# Patient Record
Sex: MALE | Race: WHITE | URBAN - METROPOLITAN AREA
[De-identification: names, ages, dates, MRNs, and addresses within clinical notes are randomized per-mention and may not be internally consistent; named-entity substitution may affect disease eponyms.]

---

## 2024-09-03 ENCOUNTER — OFFICE VISIT (OUTPATIENT)
Age: 19
End: 2024-09-03

## 2024-09-03 VITALS
DIASTOLIC BLOOD PRESSURE: 70 MMHG | HEIGHT: 67 IN | SYSTOLIC BLOOD PRESSURE: 114 MMHG | RESPIRATION RATE: 18 BRPM | WEIGHT: 152 LBS | BODY MASS INDEX: 23.86 KG/M2 | HEART RATE: 78 BPM | TEMPERATURE: 99.2 F | OXYGEN SATURATION: 98 %

## 2024-09-03 DIAGNOSIS — B34.2 CORONAVIRUS INFECTION: ICD-10-CM

## 2024-09-03 DIAGNOSIS — Z20.822 CLOSE EXPOSURE TO COVID-19 VIRUS: Primary | ICD-10-CM

## 2024-09-03 LAB
Lab: ABNORMAL
PERFORMING INSTRUMENT: ABNORMAL
QC PASS/FAIL: ABNORMAL
SARS-COV-2, POC: DETECTED

## 2024-09-03 NOTE — PROGRESS NOTES
Cyn Brady (:  2005) is a 19 y.o. male,New patient, here for evaluation of the following chief complaint(s):  Covid Testing (Covid exposure , cold chills / headache / body aches /vomiting and diarrhea x 2 days )      ASSESSMENT/PLAN:  1. Close exposure to COVID-19 virus    - POCT COVID-19, Antigen    2. Coronavirus infection    -not vaccinated for covid,instruction on covid 19 was d/w pt,quarantine at home by the CDC guidelines,increase fluid intake,take Tylenol and otc decongestant as needed.       Return if symptoms worsen or fail to improve.    SUBJECTIVE/OBJECTIVE:  Pt c/o 2 day h/o chills,body ache,vomiting and diarrhea.multiple family members is sick with covid          Vitals:    24 1239   BP: 114/70   Site: Right Upper Arm   Position: Sitting   Cuff Size: Large Adult   Pulse: 78   Resp: 18   Temp: 99.2 °F (37.3 °C)   TempSrc: Oral   SpO2: 98%   Weight: 68.9 kg (152 lb)   Height: 1.702 m (5' 7\")       Review of Systems    Physical Exam      An electronic signature was used to authenticate this note.    --KAILA JIMENES MD